# Patient Record
Sex: FEMALE | Race: WHITE | URBAN - METROPOLITAN AREA
[De-identification: names, ages, dates, MRNs, and addresses within clinical notes are randomized per-mention and may not be internally consistent; named-entity substitution may affect disease eponyms.]

---

## 2020-12-02 ENCOUNTER — APPOINTMENT (RX ONLY)
Dept: URBAN - METROPOLITAN AREA OTHER 13 | Facility: OTHER | Age: 70
Setting detail: DERMATOLOGY
End: 2020-12-02

## 2020-12-02 DIAGNOSIS — L81.4 OTHER MELANIN HYPERPIGMENTATION: ICD-10-CM

## 2020-12-02 DIAGNOSIS — L82.1 OTHER SEBORRHEIC KERATOSIS: ICD-10-CM

## 2020-12-02 DIAGNOSIS — R20.2 PARESTHESIA OF SKIN: ICD-10-CM

## 2020-12-02 DIAGNOSIS — L20.89 OTHER ATOPIC DERMATITIS: ICD-10-CM

## 2020-12-02 DIAGNOSIS — Z41.9 ENCOUNTER FOR PROCEDURE FOR PURPOSES OTHER THAN REMEDYING HEALTH STATE, UNSPECIFIED: ICD-10-CM

## 2020-12-02 PROBLEM — L20.84 INTRINSIC (ALLERGIC) ECZEMA: Status: ACTIVE | Noted: 2020-12-02

## 2020-12-02 PROCEDURE — 99202 OFFICE O/P NEW SF 15 MIN: CPT

## 2020-12-02 PROCEDURE — ? COUNSELING

## 2020-12-02 PROCEDURE — ? PRESCRIPTION

## 2020-12-02 PROCEDURE — ? TREATMENT REGIMEN

## 2020-12-02 RX ORDER — TRIAMCINOLONE ACETONIDE 1 MG/G
CREAM TOPICAL BID
Qty: 1 | Refills: 0 | Status: ERX | COMMUNITY
Start: 2020-12-02

## 2020-12-02 RX ADMIN — TRIAMCINOLONE ACETONIDE: 1 CREAM TOPICAL at 00:00

## 2020-12-02 ASSESSMENT — LOCATION SIMPLE DESCRIPTION DERM
LOCATION SIMPLE: RIGHT UPPER BACK
LOCATION SIMPLE: LEFT UPPER BACK
LOCATION SIMPLE: RIGHT LOWER BACK

## 2020-12-02 ASSESSMENT — LOCATION DETAILED DESCRIPTION DERM
LOCATION DETAILED: LEFT INFERIOR MEDIAL UPPER BACK
LOCATION DETAILED: RIGHT SUPERIOR MEDIAL MIDBACK
LOCATION DETAILED: RIGHT MID-UPPER BACK

## 2020-12-02 ASSESSMENT — LOCATION ZONE DERM: LOCATION ZONE: TRUNK

## 2020-12-02 NOTE — PROCEDURE: TREATMENT REGIMEN
Detail Level: Zone
Plan: Cool, tepid showers.
Otc Regimen: Cetaphil cream bid, Aveeno body wash
Initiate Treatment: Triamcinolone cream bid x 2 weeks
Plan: Recommend back stretches. Patient given handout
Plan: Discussed dermal filler to marionette lines. Recommend consult with Samantha. Discussed Altreno $75.00

## 2022-11-28 ENCOUNTER — APPOINTMENT (RX ONLY)
Dept: URBAN - METROPOLITAN AREA CLINIC 53 | Facility: CLINIC | Age: 72
Setting detail: DERMATOLOGY
End: 2022-11-28

## 2022-11-28 DIAGNOSIS — L81.4 OTHER MELANIN HYPERPIGMENTATION: ICD-10-CM | Status: STABLE

## 2022-11-28 DIAGNOSIS — L57.0 ACTINIC KERATOSIS: ICD-10-CM

## 2022-11-28 DIAGNOSIS — L82.1 OTHER SEBORRHEIC KERATOSIS: ICD-10-CM | Status: STABLE

## 2022-11-28 DIAGNOSIS — T07XXXA INSECT BITE, NONVENOMOUS, OF OTHER, MULTIPLE, AND UNSPECIFIED SITES, WITHOUT MENTION OF INFECTION: ICD-10-CM | Status: STABLE

## 2022-11-28 DIAGNOSIS — L72.0 EPIDERMAL CYST: ICD-10-CM

## 2022-11-28 DIAGNOSIS — L57.8 OTHER SKIN CHANGES DUE TO CHRONIC EXPOSURE TO NONIONIZING RADIATION: ICD-10-CM | Status: STABLE

## 2022-11-28 DIAGNOSIS — L92.0 GRANULOMA ANNULARE: ICD-10-CM

## 2022-11-28 DIAGNOSIS — R20.2 PARESTHESIA OF SKIN: ICD-10-CM | Status: INADEQUATELY CONTROLLED

## 2022-11-28 DIAGNOSIS — D18.0 HEMANGIOMA: ICD-10-CM | Status: STABLE

## 2022-11-28 DIAGNOSIS — D22 MELANOCYTIC NEVI: ICD-10-CM | Status: STABLE

## 2022-11-28 PROBLEM — D18.01 HEMANGIOMA OF SKIN AND SUBCUTANEOUS TISSUE: Status: ACTIVE | Noted: 2022-11-28

## 2022-11-28 PROBLEM — D22.5 MELANOCYTIC NEVI OF TRUNK: Status: ACTIVE | Noted: 2022-11-28

## 2022-11-28 PROBLEM — S90.862A INSECT BITE (NONVENOMOUS), LEFT FOOT, INITIAL ENCOUNTER: Status: ACTIVE | Noted: 2022-11-28

## 2022-11-28 PROBLEM — S90.861A INSECT BITE (NONVENOMOUS), RIGHT FOOT, INITIAL ENCOUNTER: Status: ACTIVE | Noted: 2022-11-28

## 2022-11-28 PROCEDURE — 99204 OFFICE O/P NEW MOD 45 MIN: CPT | Mod: 25

## 2022-11-28 PROCEDURE — ? LIQUID NITROGEN

## 2022-11-28 PROCEDURE — ? DEFER

## 2022-11-28 PROCEDURE — 17003 DESTRUCT PREMALG LES 2-14: CPT

## 2022-11-28 PROCEDURE — ? COUNSELING

## 2022-11-28 PROCEDURE — ? TREATMENT REGIMEN

## 2022-11-28 PROCEDURE — 17000 DESTRUCT PREMALG LESION: CPT

## 2022-11-28 PROCEDURE — ? PRESCRIPTION

## 2022-11-28 RX ORDER — TRIAMCINOLONE ACETONIDE 1 MG/G
CREAM TOPICAL BID
Qty: 453.6 | Refills: 3 | Status: ERX | COMMUNITY
Start: 2022-11-28

## 2022-11-28 RX ADMIN — TRIAMCINOLONE ACETONIDE: 1 CREAM TOPICAL at 00:00

## 2022-11-28 ASSESSMENT — LOCATION SIMPLE DESCRIPTION DERM
LOCATION SIMPLE: LEFT FOREHEAD
LOCATION SIMPLE: RIGHT POSTERIOR UPPER ARM
LOCATION SIMPLE: UPPER BACK
LOCATION SIMPLE: LEFT UPPER BACK
LOCATION SIMPLE: NOSE
LOCATION SIMPLE: LEFT POSTERIOR UPPER ARM
LOCATION SIMPLE: LEFT FOOT
LOCATION SIMPLE: RIGHT UPPER BACK
LOCATION SIMPLE: CHEST
LOCATION SIMPLE: ABDOMEN
LOCATION SIMPLE: RIGHT CHEEK
LOCATION SIMPLE: RIGHT FOOT
LOCATION SIMPLE: RIGHT FOREHEAD

## 2022-11-28 ASSESSMENT — LOCATION DETAILED DESCRIPTION DERM
LOCATION DETAILED: LEFT SUPERIOR LATERAL FOREHEAD
LOCATION DETAILED: LEFT INFERIOR UPPER BACK
LOCATION DETAILED: EPIGASTRIC SKIN
LOCATION DETAILED: RIGHT LATERAL SUPERIOR CHEST
LOCATION DETAILED: RIGHT DORSAL FOOT
LOCATION DETAILED: RIGHT INFERIOR UPPER BACK
LOCATION DETAILED: LEFT DORSAL FOOT
LOCATION DETAILED: RIGHT PROXIMAL MEDIAL POSTERIOR UPPER ARM
LOCATION DETAILED: INFERIOR THORACIC SPINE
LOCATION DETAILED: LEFT PROXIMAL MEDIAL POSTERIOR UPPER ARM
LOCATION DETAILED: RIGHT INFERIOR FOREHEAD
LOCATION DETAILED: NASAL TIP
LOCATION DETAILED: RIGHT INFERIOR MEDIAL MALAR CHEEK

## 2022-11-28 ASSESSMENT — LOCATION ZONE DERM
LOCATION ZONE: TRUNK
LOCATION ZONE: ARM
LOCATION ZONE: NOSE
LOCATION ZONE: FACE
LOCATION ZONE: FEET

## 2022-11-28 NOTE — PROCEDURE: DEFER
Detail Level: Detailed
X Size Of Lesion In Cm (Optional): 0
Scheduling Instructions (Optional): plan to have excision with Simone Cabrera PA-C
Size Of Lesion In Cm (Optional): 1.5
Procedure To Be Performed At Next Visit: Excision
Introduction Text (Please End With A Colon): The following procedure was deferred:

## 2022-11-28 NOTE — PROCEDURE: TREATMENT REGIMEN
Detail Level: Zone
Samples Given: Dermeleve cream
Initiate Treatment: Dermeleve cream use anytime for itching
Initiate Treatment: Triamcinolone cream twice daily up to 2 weeks as needed

## 2022-11-28 NOTE — PROCEDURE: COUNSELING
Sunscreen Recommendations: SPF 30 or higher
Detail Level: Detailed
Detail Level: Generalized
Detail Level: Zone
Detail Level: Simple

## 2023-12-28 ENCOUNTER — APPOINTMENT (RX ONLY)
Dept: URBAN - METROPOLITAN AREA CLINIC 53 | Facility: CLINIC | Age: 73
Setting detail: DERMATOLOGY
End: 2023-12-28

## 2023-12-28 DIAGNOSIS — Z41.9 ENCOUNTER FOR PROCEDURE FOR PURPOSES OTHER THAN REMEDYING HEALTH STATE, UNSPECIFIED: ICD-10-CM

## 2023-12-28 PROCEDURE — ? COSMETIC QUOTE

## 2023-12-28 PROCEDURE — ? RECOMMENDATIONS

## 2023-12-28 PROCEDURE — ? COSMETIC CONSULTATION: IPL

## 2023-12-28 PROCEDURE — ? COSMETIC CONSULTATION: CHEMICAL PEELS

## 2023-12-28 PROCEDURE — ? ADDITIONAL NOTES

## 2023-12-28 ASSESSMENT — LOCATION DETAILED DESCRIPTION DERM: LOCATION DETAILED: INFERIOR MID FOREHEAD

## 2023-12-28 ASSESSMENT — LOCATION ZONE DERM: LOCATION ZONE: FACE

## 2023-12-28 ASSESSMENT — LOCATION SIMPLE DESCRIPTION DERM: LOCATION SIMPLE: INFERIOR FOREHEAD

## 2023-12-28 NOTE — PROCEDURE: RECOMMENDATIONS
Detail Level: Zone
Recommendation Preamble: The following recommendations were made during the visit:
Render Risk Assessment In Note?: no
Recommendations (Free Text): ZO pigment control and brightening crème, Deann IPL , VI Peel Precision Plus.

## 2023-12-28 NOTE — PROCEDURE: ADDITIONAL NOTES
Detail Level: Zone
Additional Notes: Patient concerns are pigmentation.
Render Risk Assessment In Note?: no

## 2023-12-28 NOTE — PROCEDURE: COSMETIC QUOTE
Laser 14 Price/Unit (In Dollars- Use Only Numbers And Decimals): 0.00
Injectable 9 Percentage Discount: 0
Show Monthly Cost Breakdown: No
Laser 1 Units: 4
Misc Procedure 8 Price/Unit (In Dollars- Use Only Numbers And Decimals): 28.00
Face Procedure 1: Hydrafacial Deluxe
Body Procedure 1: Back clinical Facial
Body Procedure 5: VI Peel Body Arms
Laser 5 Price/Unit (In Dollars- Use Only Numbers And Decimals): 350
Misc Procedure 1 Price/Unit (In Dollars- Use Only Numbers And Decimals): 243
Misc Procedure 5 Price/Unit (In Dollars- Use Only Numbers And Decimals): 40.00
Body Procedure 2: Hydrafacial Keravive Full Scalp with Peptide spray
Face Procedure 8 Price/Unit (In Dollars- Use Only Numbers And Decimals): 55
Laser 2 Price/Unit (In Dollars- Use Only Numbers And Decimals): 450
Misc Procedure 9: Proscriptix Pro-Pil Fx
Face Procedure 5 Price/Unit (In Dollars- Use Only Numbers And Decimals): 329.00
Detail Level: Zone
Face Procedure 2 Price/Unit (In Dollars- Use Only Numbers And Decimals): 389.00
Misc Procedure 6: Proscriptix Calm and Correct Phyto serum
Misc Procedure 2: Proscriptix Retinols Renewal 2.5X
Body Procedure 6 Price/Unit (In Dollars- Use Only Numbers And Decimals): 200.00
Facility Fee Units (Optional): 1
Misc Procedure 6 Price/Unit (In Dollars- Use Only Numbers And Decimals): 66.15
Body Procedure 3: microneedling left axilla
Misc Procedure 2 Price/Unit (In Dollars- Use Only Numbers And Decimals): 109.20
Misc Procedure 3 Price/Unit (In Dollars- Use Only Numbers And Decimals): 130.0
Face Procedure 9 Price/Unit (In Dollars- Use Only Numbers And Decimals): 100
Face Procedure 6 Price/Unit (In Dollars- Use Only Numbers And Decimals): 75
Misc Procedure 7: ZO 10% Vitamin C Self-Activating
Misc Procedure 4: ZO Pigment control and Brightening crème
Face Procedure 10: Proscriptix Activ Balancing cleanser
Laser 4: Post care Products
Face Procedure 7: Tretinoin.05%
Laser 1: Venus IPL Face
Face Procedure 4: Anti-aging Clinical facial
Misc Procedure 8: Proscriptix Densifi Fx Volumizing Condotioner
Face Procedure 4 Price/Unit (In Dollars- Use Only Numbers And Decimals): 150
Misc Procedure 5: Elta UV Elements
Face Procedure 1 Price/Unit (In Dollars- Use Only Numbers And Decimals): 265.00
Misc Procedure 1: ZO Firming Serum
Laser 5: Venus Viva Nanofractional Perioral
Face Procedure 8: Hydrafacial eye Perk add on
Body Procedure 5 Price/Unit (In Dollars- Use Only Numbers And Decimals): 550.00
Laser 2: Venus Viva Nanofractional
Face Procedure 5: Microneedling Face
Include Sales Tax On Surgeon's Fees: Yes
Body Procedure 2 Price/Unit (In Dollars- Use Only Numbers And Decimals): 399.00
Face Procedure 2: VI Peel Precision Plus
Misc Procedure 9 Price/Unit (In Dollars- Use Only Numbers And Decimals): 46.00
Body Procedure 6: Hydrafacial Back acne
Face Procedure 9: Dermaplaning add on
Misc Procedure 3: ZO Skin Brighter
Body Procedure 3 Price/Unit (In Dollars- Use Only Numbers And Decimals): 240
Face Procedure 6: clinical extraction
Laser 3: Venus IPL Chest
Face Procedure 3: Hydrafacial signature
Body Procedure 4: VI Peel Body Chest
Misc Procedure 7 Price/Unit (In Dollars- Use Only Numbers And Decimals): 98
Face Procedure 10 Melgoza/Unit (In Dollars- Use Only Numbers And Decimals): 12.00
Laser 4 Price/Unit (In Dollars- Use Only Numbers And Decimals): 50
Misc Procedure 4 Price/Unit (In Dollars- Use Only Numbers And Decimals): 123
Face Procedure 7 Price/Unit (In Dollars- Use Only Numbers And Decimals): 65

## 2025-06-23 ENCOUNTER — APPOINTMENT (OUTPATIENT)
Dept: URBAN - METROPOLITAN AREA CLINIC 53 | Facility: CLINIC | Age: 75
Setting detail: DERMATOLOGY
End: 2025-06-23

## 2025-06-23 DIAGNOSIS — L57.8 OTHER SKIN CHANGES DUE TO CHRONIC EXPOSURE TO NONIONIZING RADIATION: ICD-10-CM | Status: STABLE

## 2025-06-23 DIAGNOSIS — L82.1 OTHER SEBORRHEIC KERATOSIS: ICD-10-CM | Status: STABLE

## 2025-06-23 DIAGNOSIS — L82.0 INFLAMED SEBORRHEIC KERATOSIS: ICD-10-CM | Status: INADEQUATELY CONTROLLED

## 2025-06-23 PROCEDURE — ? COUNSELING

## 2025-06-23 PROCEDURE — ? LIQUID NITROGEN

## 2025-06-23 PROCEDURE — ? TREATMENT REGIMEN

## 2025-06-23 ASSESSMENT — LOCATION ZONE DERM: LOCATION ZONE: FACE

## 2025-06-23 ASSESSMENT — LOCATION SIMPLE DESCRIPTION DERM: LOCATION SIMPLE: RIGHT CHEEK

## 2025-06-23 ASSESSMENT — LOCATION DETAILED DESCRIPTION DERM
LOCATION DETAILED: RIGHT SUPERIOR MEDIAL BUCCAL CHEEK
LOCATION DETAILED: RIGHT CENTRAL MALAR CHEEK

## 2025-06-23 NOTE — PROCEDURE: LIQUID NITROGEN
Show Topical Anesthesia Variable?: Yes
Detail Level: Detailed
Post-Care Instructions: I reviewed with the patient in detail post-care instructions. Patient is to wear sunprotection, and avoid picking at any of the treated lesions. Pt may apply Vaseline to crusted or scabbing areas.
Number Of Freeze-Thaw Cycles: 3 freeze-thaw cycles
Include Z78.9 (Other Specified Conditions Influencing Health Status) As An Associated Diagnosis?: No
Consent: The patient's consent was obtained including but not limited to risks of crusting, scabbing, blistering, scarring, darker or lighter pigmentary change, recurrence, incomplete removal and infection.
Medical Necessity Clause: This procedure was medically necessary because the lesions that were treated were:
Medical Necessity Information: It is in your best interest to select a reason for this procedure from the list below. All of these items fulfill various CMS LCD requirements except the new and changing color options.
Pared With?: Dermablade
Spray Paint Text: The liquid nitrogen was applied to the skin utilizing a spray paint frosting technique.